# Patient Record
Sex: FEMALE | Race: WHITE | NOT HISPANIC OR LATINO | URBAN - METROPOLITAN AREA
[De-identification: names, ages, dates, MRNs, and addresses within clinical notes are randomized per-mention and may not be internally consistent; named-entity substitution may affect disease eponyms.]

---

## 2017-02-17 ENCOUNTER — HISTORICAL (OUTPATIENT)
Dept: RADIOLOGY | Facility: HOSPITAL | Age: 52
End: 2017-02-17

## 2017-04-27 ENCOUNTER — HISTORICAL (OUTPATIENT)
Dept: LAB | Facility: HOSPITAL | Age: 52
End: 2017-04-27

## 2017-08-25 ENCOUNTER — HISTORICAL (OUTPATIENT)
Dept: RADIOLOGY | Facility: HOSPITAL | Age: 52
End: 2017-08-25

## 2023-03-12 PROCEDURE — 99283 EMERGENCY DEPT VISIT LOW MDM: CPT

## 2023-03-13 ENCOUNTER — HOSPITAL ENCOUNTER (EMERGENCY)
Facility: HOSPITAL | Age: 58
Discharge: HOME | End: 2023-03-13
Attending: FAMILY MEDICINE

## 2023-03-13 VITALS
TEMPERATURE: 99 F | OXYGEN SATURATION: 98 % | HEART RATE: 84 BPM | HEIGHT: 62 IN | RESPIRATION RATE: 18 BRPM | SYSTOLIC BLOOD PRESSURE: 149 MMHG | DIASTOLIC BLOOD PRESSURE: 95 MMHG | BODY MASS INDEX: 32.94 KG/M2 | WEIGHT: 179 LBS

## 2023-03-13 DIAGNOSIS — J02.9 PHARYNGITIS, UNSPECIFIED ETIOLOGY: Primary | ICD-10-CM

## 2023-03-13 DIAGNOSIS — J32.9 SINUSITIS, UNSPECIFIED CHRONICITY, UNSPECIFIED LOCATION: ICD-10-CM

## 2023-03-13 LAB
FLUAV AG UPPER RESP QL IA.RAPID: NOT DETECTED
FLUBV AG UPPER RESP QL IA.RAPID: NOT DETECTED
SARS-COV-2 RNA RESP QL NAA+PROBE: NOT DETECTED
STREP A PCR (OHS): NOT DETECTED

## 2023-03-13 PROCEDURE — 0240U COVID/FLU A&B PCR: CPT | Performed by: FAMILY MEDICINE

## 2023-03-13 PROCEDURE — 87651 STREP A DNA AMP PROBE: CPT | Performed by: FAMILY MEDICINE

## 2023-03-13 RX ORDER — AZITHROMYCIN 250 MG/1
250 TABLET, FILM COATED ORAL DAILY
Qty: 6 TABLET | Refills: 0 | Status: SHIPPED | OUTPATIENT
Start: 2023-03-13

## 2023-03-13 NOTE — ED PROVIDER NOTES
Encounter Date: 3/12/2023       History     Chief Complaint   Patient presents with    Fever     For about 3 days, with sore throat and chills, thinks may be related to allergies.     Chills    Sore Throat    Otalgia     Sore throat   57-year-old female patient with sore throat body aches and weakness for the last 2 days no sick contacts no fever patient otherwise no chronic conditions that would be contributing to today's episode.      Review of patient's allergies indicates:   Allergen Reactions    Ciprofloxacin     Claritin [loratadine]     Danocrine [danazol]     Ibuprofen     Sulfa (sulfonamide antibiotics)      No past medical history on file.  No past surgical history on file.  No family history on file.     Review of Systems   Constitutional:  Negative for fever.   HENT:  Positive for sore throat.    Respiratory:  Negative for shortness of breath.    Cardiovascular:  Negative for chest pain.   Gastrointestinal:  Negative for nausea.   Genitourinary:  Negative for dysuria.   Musculoskeletal:  Negative for back pain.   Skin:  Negative for rash.   Neurological:  Negative for weakness.   Hematological:  Does not bruise/bleed easily.   All other systems reviewed and are negative.    Physical Exam     Initial Vitals [03/13/23 0012]   BP Pulse Resp Temp SpO2   (!) 149/95 84 18 98.7 °F (37.1 °C) 98 %      MAP       --         Physical Exam    Nursing note and vitals reviewed.  Constitutional: She appears well-developed.   HENT:   Head: Normocephalic and atraumatic.   Right Ear: External ear normal.   Left Ear: External ear normal.   Nose: Nose normal.   Mouth/Throat: Oropharynx is clear and moist. No oropharyngeal exudate.   Eyes: Conjunctivae and EOM are normal. Pupils are equal, round, and reactive to light. Right eye exhibits no discharge. Left eye exhibits no discharge.   Neck: Neck supple. No tracheal deviation present. No JVD present.   Normal range of motion.  Cardiovascular:  Normal rate, regular rhythm,  normal heart sounds and intact distal pulses.     Exam reveals no gallop and no friction rub.       No murmur heard.  Pulmonary/Chest: Breath sounds normal. No stridor. No respiratory distress. She has no wheezes. She has no rhonchi. She has no rales.   Abdominal: Abdomen is soft. Bowel sounds are normal. She exhibits no distension and no mass. There is no abdominal tenderness. There is no rebound and no guarding.   Musculoskeletal:         General: Normal range of motion.      Cervical back: Normal range of motion and neck supple.     Neurological: She is alert and oriented to person, place, and time. She has normal strength. No cranial nerve deficit.   Skin: Skin is warm and dry. No rash and no abscess noted. No erythema.   Psychiatric: She has a normal mood and affect. Her behavior is normal. Judgment and thought content normal.       ED Course   Procedures  Labs Reviewed   COVID/FLU A&B PCR - Normal    Narrative:     The Xpert Xpress SARS-CoV-2/FLU/RSV plus is a rapid, multiplexed real-time PCR test intended for the simultaneous qualitative detection and differentiation of SARS-CoV-2, Influenza A, Influenza B, and respiratory syncytial virus (RSV) viral RNA in either nasopharyngeal swab or nasal swab specimens.         STREP GROUP A BY PCR - Normal    Narrative:     The Xpert Xpress Strep A test is a rapid, qualitative in vitro diagnostic test for the detection of Streptococcus pyogenes (Group A ß-hemolytic Streptococcus, Strep A) in throat swab specimens from patients with signs and symptoms of pharyngitis.            Imaging Results    None          Medications - No data to display  Medical Decision Making:   Differential Diagnosis:   COVID flu strep pneumonia                        Clinical Impression:   Final diagnoses:  [J02.9] Pharyngitis, unspecified etiology (Primary)  [J32.9] Sinusitis, unspecified chronicity, unspecified location        ED Disposition Condition    Discharge Stable          ED  Prescriptions       Medication Sig Dispense Start Date End Date Auth. Provider    azithromycin (Z-EDUAR) 250 MG tablet Take 1 tablet (250 mg total) by mouth once daily. Take first 2 tablets together, then 1 every day until finished. 6 tablet 3/13/2023 -- Jerod Lopez MD          Follow-up Information    None          Jerod Lopez MD  03/13/23 0918

## 2024-10-04 ENCOUNTER — HOSPITAL ENCOUNTER (EMERGENCY)
Facility: HOSPITAL | Age: 59
Discharge: HOME | End: 2024-10-04
Attending: EMERGENCY MEDICINE

## 2024-10-04 VITALS
BODY MASS INDEX: 32.82 KG/M2 | RESPIRATION RATE: 18 BRPM | DIASTOLIC BLOOD PRESSURE: 98 MMHG | HEIGHT: 61 IN | HEART RATE: 80 BPM | OXYGEN SATURATION: 98 % | SYSTOLIC BLOOD PRESSURE: 160 MMHG | WEIGHT: 173.81 LBS | TEMPERATURE: 98 F

## 2024-10-04 DIAGNOSIS — R51.9 ACUTE NONINTRACTABLE HEADACHE, UNSPECIFIED HEADACHE TYPE: Primary | ICD-10-CM

## 2024-10-04 PROCEDURE — 99284 EMERGENCY DEPT VISIT MOD MDM: CPT | Mod: 25

## 2024-10-04 RX ORDER — HYDROCODONE BITARTRATE AND ACETAMINOPHEN 5; 325 MG/1; MG/1
1 TABLET ORAL
Status: DISCONTINUED | OUTPATIENT
Start: 2024-10-04 | End: 2024-10-04 | Stop reason: HOSPADM

## 2024-10-04 RX ORDER — AMITRIPTYLINE HYDROCHLORIDE 25 MG/1
25 TABLET, FILM COATED ORAL NIGHTLY PRN
Qty: 30 TABLET | Refills: 0 | Status: SHIPPED | OUTPATIENT
Start: 2024-10-04 | End: 2024-11-03

## 2024-10-04 NOTE — DISCHARGE INSTRUCTIONS
Take Tylenol or Advil as needed for pain.  Take the Elavil at bedtime.  Follow up with your primary care doctor next week.  If no primary care doctor you may call the [facility] for follow up.  Return to the ER for worsening symptoms or condition.

## 2024-10-04 NOTE — ED PROVIDER NOTES
"Encounter Date: 10/4/2024       History     Chief Complaint   Patient presents with    Headache     Pt states when she lays on right side to sleep the top of the right side of her head  gets "prickly"  and her hair hurts.  She states this has been going on for over 2 weeks. History of neck surgery in early 2000s     Patient presents to ER today with a complaint of right-sided headache.  This has been ongoing for 2 weeks.  No trauma or injury.  No photophobia.  Patient states she is unable to lay on her right side due to the head pain.  Patient has had neck surgery with rhizotomies.  Denies paresthesia right upper extremity    The history is provided by the patient.     Review of patient's allergies indicates:   Allergen Reactions    Ciprofloxacin     Claritin [loratadine]     Danocrine [danazol]     Ibuprofen     Sulfa (sulfonamide antibiotics)      Past Medical History:   Diagnosis Date    GERD (gastroesophageal reflux disease)      Past Surgical History:   Procedure Laterality Date    CERVICAL FUSION      HYSTERECTOMY      RHIZOTOMY       No family history on file.  Social History     Tobacco Use    Smoking status: Every Day     Types: Cigarettes    Smokeless tobacco: Never   Substance Use Topics    Drug use: Never     Review of Systems   Neurological:  Positive for headaches.   All other systems reviewed and are negative.      Physical Exam     Initial Vitals [10/04/24 1804]   BP Pulse Resp Temp SpO2   (!) 178/93 80 18 98.3 °F (36.8 °C) 98 %      MAP       --         Physical Exam    Nursing note and vitals reviewed.  Constitutional: She appears well-developed and well-nourished.   HENT:   Head: Normocephalic and atraumatic.   Nose: Nose normal. Mouth/Throat: Oropharynx is clear and moist.   Right side is scalp is slightly tender to palpate.  There is no discrete abscess or open wound noted.   Eyes: Conjunctivae and EOM are normal. Pupils are equal, round, and reactive to light.   Neck: Neck supple.   Normal " range of motion.  Cardiovascular:  Normal rate, regular rhythm and intact distal pulses.           Pulmonary/Chest: Breath sounds normal.   Musculoskeletal:         General: Normal range of motion.      Cervical back: Normal range of motion and neck supple.     Neurological: She is alert and oriented to person, place, and time. She has normal strength.   Cranial nerves 2-12 intact   Skin: Skin is warm and dry. Capillary refill takes less than 2 seconds.   Psychiatric: She has a normal mood and affect. Her behavior is normal. Judgment and thought content normal.         ED Course   Procedures  Labs Reviewed - No data to display       Imaging Results              CT Head Without Contrast (Final result)  Result time 10/04/24 18:29:51      Final result by Idalia Brooke MD (10/04/24 18:29:51)                   Impression:      No acute intracranial abnormality.      Electronically signed by: Idalia Brooke  Date:    10/04/2024  Time:    18:29               Narrative:    EXAMINATION:  CT HEAD WITHOUT CONTRAST    CLINICAL HISTORY:  Headache, sudden, severe;    TECHNIQUE:  Axial scans were obtained from skull base to the vertex.    Coronal and sagittal reconstructions obtained from the axial data.    Automatic exposure control was utilized to limit radiation dose.    Contrast: None    Radiation Dose:    Total DLP: 1105 mGy*cm    COMPARISON:  None    FINDINGS:  There is no acute intracranial hemorrhage or edema. The gray-white matter differentiation is preserved.  There is been prior small lacunar infarct in the left frontal lobe.    There is no mass effect or midline shift. The ventricles and sulci are normal in size. The basal cisterns are patent. There is no abnormal extra-axial fluid collection.  Carotid and vertebral artery calcifications are noted.    The calvarium and skull base are intact. The visualized paranasal sinuses and the mastoid air cells are clear.                                       Medications    HYDROcodone-acetaminophen 5-325 mg per tablet 1 tablet (has no administration in time range)     Medical Decision Making  Headache, paresthesias scalp, tension headache, migraine    Amount and/or Complexity of Data Reviewed  Radiology: ordered.     Details: CT head was negative.    Risk  Risk Details: Test results discussed with patient.  Patient to take Tylenol or Motrin for pain.  Take the Elavil at bedtime.  Follow up with her primary care doctor next week.  If no primary care doctor she may follow up with the [facility].  Return to the ER for worsening symptoms or condition.  Patient verbalized understanding and agrees to treatment plan.                                      Clinical Impression:  Final diagnoses:  [R51.9] Acute nonintractable headache, unspecified headache type (Primary)          ED Disposition Condition    Discharge Stable          ED Prescriptions       Medication Sig Dispense Start Date End Date Auth. Provider    amitriptyline (ELAVIL) 25 MG tablet Take 1 tablet (25 mg total) by mouth nightly as needed for Insomnia. 30 tablet 10/4/2024 11/3/2024 Mago Tuttle PA          Follow-up Information       Follow up With Specialties Details Why Contact Info    [facility]    [phone]             Mago Tuttle PA  10/04/24 1900       Mago Tuttle PA  10/04/24 1910

## 2024-11-08 ENCOUNTER — HOSPITAL ENCOUNTER (EMERGENCY)
Facility: HOSPITAL | Age: 59
Discharge: HOME | End: 2024-11-08
Attending: STUDENT IN AN ORGANIZED HEALTH CARE EDUCATION/TRAINING PROGRAM

## 2024-11-08 VITALS
HEART RATE: 82 BPM | SYSTOLIC BLOOD PRESSURE: 176 MMHG | WEIGHT: 165 LBS | TEMPERATURE: 98 F | DIASTOLIC BLOOD PRESSURE: 79 MMHG | RESPIRATION RATE: 20 BRPM | BODY MASS INDEX: 31.18 KG/M2 | OXYGEN SATURATION: 100 %

## 2024-11-08 DIAGNOSIS — H60.91 OTITIS EXTERNA OF RIGHT EAR, UNSPECIFIED CHRONICITY, UNSPECIFIED TYPE: Primary | ICD-10-CM

## 2024-11-08 PROCEDURE — 99283 EMERGENCY DEPT VISIT LOW MDM: CPT

## 2024-11-08 RX ORDER — NEOMYCIN SULFATE, POLYMYXIN B SULFATE AND HYDROCORTISONE 10; 3.5; 1 MG/ML; MG/ML; [USP'U]/ML
3 SUSPENSION/ DROPS AURICULAR (OTIC) 3 TIMES DAILY
Qty: 10 ML | Refills: 0 | Status: SHIPPED | OUTPATIENT
Start: 2024-11-08 | End: 2024-11-13

## 2024-11-09 NOTE — ED PROVIDER NOTES
Encounter Date: 11/8/2024       History     Chief Complaint   Patient presents with    Otalgia     RT EAR ISSUE- FREE FO DRAINAGE/AFEBRILE.     Patient is a 59-year-old white female who presented to the ER today due to right-sided ear pain.  She states it when the wind blows by her ear it seems to irritated.  She states that she has no significant changes in his hearing.  She denies any fever, chills, cough, congestion, chest pain, shortness of breath or abdominal pain.  She states symptoms started yesterday.      Review of patient's allergies indicates:   Allergen Reactions    Ciprofloxacin     Claritin [loratadine]     Danocrine [danazol]     Ibuprofen     Sulfa (sulfonamide antibiotics)      Past Medical History:   Diagnosis Date    GERD (gastroesophageal reflux disease)      Past Surgical History:   Procedure Laterality Date    CERVICAL FUSION      HYSTERECTOMY      RHIZOTOMY       No family history on file.  Social History     Tobacco Use    Smoking status: Every Day     Types: Cigarettes    Smokeless tobacco: Never   Substance Use Topics    Drug use: Never     Review of Systems   Constitutional:  Negative for chills, fatigue and fever.   HENT:  Positive for ear pain. Negative for congestion, ear discharge, sore throat and trouble swallowing.    Eyes:  Negative for pain and visual disturbance.   Respiratory:  Negative for cough, shortness of breath and wheezing.    Cardiovascular:  Negative for chest pain and palpitations.   Gastrointestinal:  Negative for abdominal pain, blood in stool, constipation, diarrhea, nausea and vomiting.   Genitourinary:  Negative for dysuria and hematuria.   Musculoskeletal:  Negative for back pain and myalgias.   Skin:  Negative for rash and wound.   Neurological:  Negative for seizures, syncope and headaches.   Psychiatric/Behavioral:  Negative for confusion. The patient is not nervous/anxious.        Physical Exam     Initial Vitals [11/08/24 1937]   BP Pulse Resp Temp SpO2   (!)  176/79 82 20 98.3 °F (36.8 °C) 100 %      MAP       --         Physical Exam    Nursing note and vitals reviewed.  Constitutional: She appears well-developed and well-nourished. She is not diaphoretic. No distress.   HENT:   Head: Normocephalic.   Right Ear: External ear normal.   Left Ear: External ear normal.   Nose: Nose normal.   TMs bilaterally clear gray with no erythema.  Right external auditory canal with an abrasion to the inferior border and surrounding erythema consistent with otitis externa.  Abrasion appears to be from a foreign body that was introduced into the ear canal.  Patient verbalize that she did use a Q-tip yesterday.   Eyes: Conjunctivae and EOM are normal. Right eye exhibits no discharge. Left eye exhibits no discharge. No scleral icterus.   Neck:   Normal range of motion.  Cardiovascular:  Normal rate, regular rhythm and normal heart sounds.     Exam reveals no gallop and no friction rub.       No murmur heard.  Pulmonary/Chest: Breath sounds normal. No stridor. No respiratory distress. She has no wheezes. She has no rhonchi. She has no rales.   Musculoskeletal:         General: Normal range of motion.      Cervical back: Normal range of motion.     Neurological: She is alert.   Skin: Skin is warm. No rash noted. No erythema.   Psychiatric: She has a normal mood and affect. Her behavior is normal.         ED Course   Procedures  Labs Reviewed - No data to display       Imaging Results    None          Medications - No data to display  Medical Decision Making  Differentials: Otitis media, otitis externa   Historian is the patient   59-year-old well-appearing female presents with right-sided ear pain.  Abrasion with surrounding erythema appreciated in the right external ear canal.  Antibiotic drops sent pharmacy.  Advised patient Q-tips she would not be used.  All questions answered in layman's terms and return precautions were discussed.    Risk  Prescription drug management.                                       Clinical Impression:  Final diagnoses:  [H60.91] Otitis externa of right ear, unspecified chronicity, unspecified type (Primary)          ED Disposition Condition    Discharge Stable          ED Prescriptions       Medication Sig Dispense Start Date End Date Auth. Provider    neomycin-polymyxin-hydrocortisone (CORTISPORIN) 3.5-10,000-1 mg/mL-unit/mL-% otic suspension Place 3 drops into the right ear 3 (three) times daily. for 5 days 10 mL 11/8/2024 11/13/2024 Ankit Woods MD          Follow-up Information       Follow up With Specialties Details Why Contact Info    [facility] - Emergency Dept Emergency Medicine  If symptoms worsen [address] [id number] [phone]             Ankit Woods MD  11/08/24 2122

## 2025-06-30 ENCOUNTER — HOSPITAL ENCOUNTER (EMERGENCY)
Facility: HOSPITAL | Age: 60
Discharge: HOME | End: 2025-06-30
Attending: SPECIALIST
Payer: MEDICARE

## 2025-06-30 VITALS
RESPIRATION RATE: 20 BRPM | HEART RATE: 82 BPM | OXYGEN SATURATION: 99 % | DIASTOLIC BLOOD PRESSURE: 85 MMHG | TEMPERATURE: 99 F | BODY MASS INDEX: 32.47 KG/M2 | SYSTOLIC BLOOD PRESSURE: 170 MMHG | HEIGHT: 61 IN | WEIGHT: 172 LBS

## 2025-06-30 DIAGNOSIS — R03.0 ELEVATED BLOOD PRESSURE READING: ICD-10-CM

## 2025-06-30 DIAGNOSIS — G51.0 LEFT-SIDED BELL'S PALSY: Primary | ICD-10-CM

## 2025-06-30 DIAGNOSIS — K02.9 DENTAL CARIES: ICD-10-CM

## 2025-06-30 PROCEDURE — 99284 EMERGENCY DEPT VISIT MOD MDM: CPT

## 2025-06-30 PROCEDURE — 25000003 PHARM REV CODE 250: Performed by: SPECIALIST

## 2025-06-30 PROCEDURE — 63600175 PHARM REV CODE 636 W HCPCS: Performed by: SPECIALIST

## 2025-06-30 RX ORDER — AMOXICILLIN 500 MG/1
500 CAPSULE ORAL 3 TIMES DAILY
Qty: 21 CAPSULE | Refills: 0 | Status: SHIPPED | OUTPATIENT
Start: 2025-06-30 | End: 2025-07-07

## 2025-06-30 RX ORDER — PREDNISONE 20 MG/1
20 TABLET ORAL
Status: COMPLETED | OUTPATIENT
Start: 2025-06-30 | End: 2025-06-30

## 2025-06-30 RX ORDER — AMOXICILLIN 500 MG/1
500 CAPSULE ORAL
Status: COMPLETED | OUTPATIENT
Start: 2025-06-30 | End: 2025-06-30

## 2025-06-30 RX ORDER — METHYLPREDNISOLONE 4 MG/1
TABLET ORAL
Qty: 21 EACH | Refills: 0 | Status: SHIPPED | OUTPATIENT
Start: 2025-06-30 | End: 2025-07-21

## 2025-06-30 RX ADMIN — AMOXICILLIN 500 MG: 500 CAPSULE ORAL at 07:06

## 2025-06-30 RX ADMIN — PREDNISONE 20 MG: 20 TABLET ORAL at 07:06

## 2025-06-30 NOTE — DISCHARGE INSTRUCTIONS
Recommend eye lubricant which is over-the-counter, you can put in your eye at nighttime; eyedrops during the daytime

## 2025-07-01 NOTE — ED PROVIDER NOTES
Encounter Date: 6/30/2025       History     Chief Complaint   Patient presents with    Facial Droop     Pt reports L sided facial drooping onset 3 days ago; denies 1 sided weakness/loss of sensation or AMS. Reports upper L tooth fell out 3 days ago prior to onset     59-year-old female notes left-sided facial drooping that began 3 days ago and states it started when she had a left upper tooth fall out; she notes irritation to her gum tissue on that side with some discomfort, no fever; patient denies any focal deficits, no arm or leg weakness, no trouble ambulating, no vision changes; she does however have trouble closing her left eye and has drooling of liquids because of the left facial droop    The history is provided by the patient and a relative (Daughter).     Review of patient's allergies indicates:   Allergen Reactions    Ciprofloxacin     Claritin [loratadine]     Danocrine [danazol]     Ibuprofen     Sulfa (sulfonamide antibiotics)      Past Medical History:   Diagnosis Date    GERD (gastroesophageal reflux disease)      Past Surgical History:   Procedure Laterality Date    CERVICAL FUSION      HYSTERECTOMY      RHIZOTOMY       No family history on file.  Social History[1]  Review of Systems   Constitutional: Negative.    HENT:  Positive for dental problem.    Respiratory: Negative.     Cardiovascular: Negative.    Gastrointestinal:         GERD   Musculoskeletal: Negative.    Skin: Negative.    Neurological: Negative.    All other systems reviewed and are negative.      Physical Exam     Initial Vitals [06/30/25 1837]   BP Pulse Resp Temp SpO2   (!) 170/85 82 20 98.7 °F (37.1 °C) 99 %      MAP       --         Physical Exam    Nursing note and vitals reviewed.  Constitutional: She appears well-developed and well-nourished.   HENT:   Head: Normocephalic and atraumatic.   Dental caries with multiple missing teeth, left upper gum tissue with erythema and some swelling;  No facial swelling   Eyes: EOM are  normal. Pupils are equal, round, and reactive to light.   Neck: Neck supple.   Normal range of motion.  Cardiovascular:  Normal rate, regular rhythm, normal heart sounds and intact distal pulses.           Pulmonary/Chest: Breath sounds normal.   Abdominal: Abdomen is soft. Bowel sounds are normal.   Musculoskeletal:         General: Normal range of motion.      Cervical back: Normal range of motion and neck supple.     Neurological: She is alert and oriented to person, place, and time. She has normal strength. A cranial nerve deficit (Left facial nerve palsy) is present. GCS score is 15. GCS eye subscore is 4. GCS verbal subscore is 5. GCS motor subscore is 6.   Normal gait, no ataxia, negative Romberg test   Skin: Skin is warm and dry.         ED Course   Procedures  Labs Reviewed - No data to display       Imaging Results    None          Medications   amoxicillin capsule 500 mg (500 mg Oral Given 6/30/25 1904)   predniSONE tablet 20 mg (20 mg Oral Given 6/30/25 1904)     Medical Decision Making  59-year-old female notes left-sided facial drooping that began 3 days ago and states it started when she had a left upper tooth fall out; she notes irritation to her gum tissue on that side with some discomfort, no fever; patient denies any focal deficits, no arm or leg weakness, no trouble ambulating, no vision changes; she does however have trouble closing her left eye and has drooling of liquids because of the left facial droop    Differential diagnosis- Bell's palsy, dental infection    Risk  Prescription drug management.  Risk Details: Will treat potential dental infection with Amoxil 500 mg 3 times a day for 7 days; 1st dose given in the ER; will also give prednisone for patient's Bell's palsy and prescribe a Medrol Dosepak; discussed the diagnosis with patient and her daughter at length and also gave information by handout; encouraged follow up within the next week the primary care physician and also with  "dentist       Patient Vitals for the past 24 hrs:   BP Temp Temp src Pulse Resp SpO2 Height Weight   06/30/25 1837 (!) 170/85 98.7 °F (37.1 °C) Oral 82 20 99 % 5' 1" (1.549 m) 78 kg (172 lb)                                    Clinical Impression:  Final diagnoses:  [G51.0] Left-sided Bell's palsy (Primary)  [K02.9] Dental caries  [R03.0] Elevated blood pressure reading          ED Disposition Condition    Discharge Stable          ED Prescriptions       Medication Sig Dispense Start Date End Date Auth. Provider    amoxicillin (AMOXIL) 500 MG capsule Take 1 capsule (500 mg total) by mouth 3 (three) times daily. for 7 days 21 capsule 6/30/2025 7/7/2025 Kimani Soriano MD    methylPREDNISolone (MEDROL DOSEPACK) 4 mg tablet use as directed 21 each 6/30/2025 7/21/2025 Kimani Soriano MD          Follow-up Information       Follow up With Specialties Details Why Contact Info    Primary care MD  In 1 week      Dentist  Schedule an appointment as soon as possible for a visit                      [1]   Social History  Tobacco Use    Smoking status: Every Day     Types: Cigarettes    Smokeless tobacco: Never   Substance Use Topics    Drug use: Never        Kimani Soriano MD  06/30/25 1126    "